# Patient Record
Sex: MALE | Race: WHITE | Employment: PART TIME | URBAN - METROPOLITAN AREA
[De-identification: names, ages, dates, MRNs, and addresses within clinical notes are randomized per-mention and may not be internally consistent; named-entity substitution may affect disease eponyms.]

---

## 2019-12-17 ENCOUNTER — APPOINTMENT (OUTPATIENT)
Dept: GENERAL RADIOLOGY | Age: 24
End: 2019-12-17
Attending: EMERGENCY MEDICINE
Payer: OTHER MISCELLANEOUS

## 2019-12-17 ENCOUNTER — HOSPITAL ENCOUNTER (EMERGENCY)
Age: 24
Discharge: HOME | End: 2019-12-17
Attending: EMERGENCY MEDICINE
Payer: OTHER MISCELLANEOUS

## 2019-12-17 VITALS
WEIGHT: 145 LBS | RESPIRATION RATE: 18 BRPM | OXYGEN SATURATION: 100 % | SYSTOLIC BLOOD PRESSURE: 126 MMHG | TEMPERATURE: 98.8 F | DIASTOLIC BLOOD PRESSURE: 73 MMHG | HEART RATE: 74 BPM

## 2019-12-17 DIAGNOSIS — Z23 NEED FOR TDAP VACCINATION: ICD-10-CM

## 2019-12-17 DIAGNOSIS — S61.217A LACERATION OF LEFT LITTLE FINGER WITHOUT FOREIGN BODY WITHOUT DAMAGE TO NAIL, INITIAL ENCOUNTER: Primary | ICD-10-CM

## 2019-12-17 DIAGNOSIS — Z02.6 ENCOUNTER RELATED TO WORKER'S COMPENSATION CLAIM: ICD-10-CM

## 2019-12-17 PROCEDURE — 99283 EMERGENCY DEPT VISIT LOW MDM: CPT

## 2019-12-17 PROCEDURE — 77030002916 HC SUT ETHLN J&J -A

## 2019-12-17 PROCEDURE — 75810000293 HC SIMP/SUPERF WND  RPR

## 2019-12-17 PROCEDURE — 74011250636 HC RX REV CODE- 250/636: Performed by: PHYSICIAN ASSISTANT

## 2019-12-17 PROCEDURE — 90471 IMMUNIZATION ADMIN: CPT

## 2019-12-17 PROCEDURE — 90715 TDAP VACCINE 7 YRS/> IM: CPT | Performed by: PHYSICIAN ASSISTANT

## 2019-12-17 PROCEDURE — 73140 X-RAY EXAM OF FINGER(S): CPT

## 2019-12-17 PROCEDURE — 77030008304 HC SPLNT FNGR ALUM DERY -A

## 2019-12-17 RX ORDER — BACITRACIN 500 [USP'U]/G
OINTMENT TOPICAL
Status: DISCONTINUED | OUTPATIENT
Start: 2019-12-17 | End: 2019-12-17 | Stop reason: HOSPADM

## 2019-12-17 RX ADMIN — TETANUS TOXOID, REDUCED DIPHTHERIA TOXOID AND ACELLULAR PERTUSSIS VACCINE, ADSORBED 0.5 ML: 5; 2.5; 8; 8; 2.5 SUSPENSION INTRAMUSCULAR at 17:56

## 2019-12-17 NOTE — ED TRIAGE NOTES
Patient arrived to ed with a laceration to left 5th finger.  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

## 2019-12-17 NOTE — ED NOTES
Bacitracin and non adhesive dressing and frog splint applied to LEFT pinky digit    Discharge instructions reviewed with the patient with opportunity for questions given. The patient verbalized understanding. Patient armband removed and shredded. Patient in stable condition at time of discharge.

## 2019-12-17 NOTE — ED PROVIDER NOTES
EMERGENCY DEPARTMENT HISTORY AND PHYSICAL EXAM    Date: 12/17/2019  Patient Name: Swathi Thornton    History of Presenting Illness     Chief Complaint   Patient presents with    Laceration         History Provided By: Patient    Chief Complaint: laceration    HPI(Context):   5:28 PM  Swathi Thornton is a 27 y.o. male who presents to the emergency department C/O laceration to left 5th finger. Associated sxs include numbness to left fifth digit. Pt works at [employer] and cut finger with knife while at work. Bleeding controlled with pressure. Pt reports numbness to tip of left 5th digit. Pt unsure of last tetanus. Pt denies reduced ROM, blood thinner use, and any other sxs or complaints. Pt states this is worker's comp claim. PCP: None        Past History     Past Medical History:  History reviewed. No pertinent past medical history. Past Surgical History:  History reviewed. No pertinent surgical history. Family History:  History reviewed. No pertinent family history. Social History:  Social History     Tobacco Use    Smoking status: Never Smoker    Smokeless tobacco: Never Used   Substance Use Topics    Alcohol use: Not on file    Drug use: Not on file       Allergies:  No Known Allergies      Review of Systems   Review of Systems   Musculoskeletal: Positive for arthralgias. Skin: Positive for wound. Neurological: Positive for numbness. Negative for light-headedness. Hematological: Does not bruise/bleed easily. All other systems reviewed and are negative. Physical Exam     Vitals:    12/17/19 1719   BP: 126/73   Pulse: 74   Resp: 18   Temp: 98.8 °F (37.1 °C)   SpO2: 100%   Weight: 65.8 kg (145 lb)     Physical Exam  Vitals signs and nursing note reviewed. Constitutional:       General: He is not in acute distress. Comments:  male in NAD. Alert. Appears comfortable. HENT:      Head: Normocephalic and atraumatic.       Right Ear: External ear normal.      Left Ear: External ear normal.      Nose: Nose normal.   Neck:      Musculoskeletal: Normal range of motion. Cardiovascular:      Rate and Rhythm: Normal rate and regular rhythm. Pulses: Normal pulses. Pulmonary:      Effort: Pulmonary effort is normal.   Musculoskeletal: Normal range of motion. Left hand: He exhibits tenderness and laceration. He exhibits normal range of motion, normal capillary refill and no swelling. Normal sensation noted. Normal strength noted. Hands:    Neurological:      Mental Status: He is alert and oriented to person, place, and time. Psychiatric:         Mood and Affect: Mood normal.             Diagnostic Study Results     Labs -   No results found for this or any previous visit (from the past 12 hour(s)). XR 5TH FINGER LT MIN 2 V   Final Result   Impression:   ============      Soft tissue injury. No acute osseous abnormality. No radiopaque foreign body. CT Results  (Last 48 hours)    None        CXR Results  (Last 48 hours)    None          Medications given in the ED-  Medications   diph,Pertuss(AC),Tet Vac-PF (BOOSTRIX) suspension 0.5 mL (0.5 mL IntraMUSCular Given 12/17/19 6073)         Medical Decision Making   I am the first provider for this patient. I reviewed the vital signs, available nursing notes, past medical history, past surgical history, family history and social history. Vital Signs-Reviewed the patient's vital signs. Pulse Oximetry Analysis - 100% on RA     Records Reviewed: Nursing Notes    Provider Notes (Medical Decision Making): lac to left fifth digit. FROM. Reduced sensation to distal aspect but <2 sec cap refill.  Will perform suture repair and update tetanus    Procedures:  Wound Repair  Date/Time: 12/17/2019 6:35 PM  Performed by: [id number] provider: Dr. Anne-Marie Vasquez  Preparation: sterile field established and skin prepped with ChloraPrep  Location details: left small finger  Wound length:2.5 cm or less  Anesthesia: local infiltration    Anesthesia:  Local Anesthetic: lidocaine 1% without epinephrine  Anesthetic total: 1 mL  Foreign bodies: no foreign bodies  Irrigation solution: saline  Irrigation method: syringe  Skin closure: 5-0 nylon  Number of sutures: 3  Technique: simple  Approximation: close  Dressing: antibiotic ointment, splint and 4x4  Patient tolerance: Patient tolerated the procedure well with no immediate complications  My total time at bedside, performing this procedure was 1-15 minutes. ED Course:   5:28 PM Initial assessment performed. The patients presenting problems have been discussed, and they are in agreement with the care plan formulated and outlined with them. I have encouraged them to ask questions as they arise throughout their visit. Diagnosis and Disposition       Successful repair. Splinted. Tetanus updated. Suture removal in 7 days. Worker's comp paperwork completed. Reasons to RTED discussed with pt. All questions answered. Pt feels comfortable going home at this time. Pt expressed understanding and he agrees with plan. 1. Laceration of left little finger without foreign body without damage to nail, initial encounter    2. Need for Tdap vaccination    3. Encounter related to worker's compensation claim        PLAN:  1. D/C Home  2. There are no discharge medications for this patient. 3.   Follow-up Information     Follow up With Specialties Details Why [facility]    THE [facility] EMERGENCY DEPT Emergency Medicine  suture removal in 7 days [address] [place] [address]  [phone]    [address] [place] [address] N Occupational Medicine  Follow up with occupational health [address] [place] [address] #A  Kaushik Flores [address] [place] [address] Drive    Tati Anderson MD Plastic Surgery  may follow up with hand surgeon as needed [address] [place] [address] Crossing [phone]          _______________________________    Attestations:   This note is prepared by Bhumika Harry Isabel Hernandez PA-C.  _______________________________          Please note that this dictation was completed with [employer], the computer voice recognition software. Quite often unanticipated grammatical, syntax, homophones, and other interpretive errors are inadvertently transcribed by the computer software. Please disregard these errors. Please excuse any errors that have escaped final proofreading.

## 2019-12-17 NOTE — LETTER
[facility] [place] 
THE [facility] EMERGENCY DEPT 
[address] [address] [phone] Candie Salvador Date: 12/17/2019 Sex: male Ailyn Kirk [facility] [address] YOB: 1995 Age: 27 y.o. Occupational Medicine Return to Work Form          Date of Treatment:  12/17/2019 Diagnosis: ICD-10-CM ICD-9-CM 1. Laceration of left little finger without foreign body without damage to nail, initial encounter S61.217A 883.0 2. Need for Tdap vaccination Z23 V06.1 3. Encounter related to worker's compensation claim Z02.6 V70.3 Instructions:  Employee must return copy of this report to Personnel and/or Supervisor. Patient Identification - Company Protocol:   
 [x]  Checked & Followed []  Not Available PART I:  Check Treatment [x]  X-ray   []  Lab  [x]  Discharge Instructions Given  []  Rx Given 
 [x]  Non-Prescription Meds  [x]  Sutures     []  EKG   
 []  Other (Comment):   
 
Part II:  Disposition 
 []  May Return to Regular Work Without Restrictions [x]  May Return to Restricted Duty as Below May return to full duty on 12/25/2019 when sutures are removed Explanation of RESTRICTIONS (Comment):   
 
 []  May NOT Return to Work until cleared by Referral Specialist or Occupational Medicine MD 
 
Part III:  Follow-Up Follow-up Information Follow up With Specialties Details Why Contact Info [facility] EMERGENCY DEPT Emergency Medicine  suture removal in 7 days [address] [place] [address] 
[phone] [facility] OCCUPATIONAL MED Occupational Medicine  Follow up with occupational health [id number] [place] [address] #A [place] [address] 
[phone] Emy Washington MD Plastic Surgery  may follow up with hand surgeon as needed [facility] [address] [place] [address] 
[phone] Part IV: Was Workers Comp Supervisor Notified  []   Yes  []   No 
 
 Dorie Huerta was seen in the Emergency Department on 12/17/2019 by the following provider(s): 
Attending Provider: Dena Arthur MD 
Physician Assistant: Michelle Jaimes; ED Nurse: PLEASE CALL CASE FACILITATOR, OCCUPATIONAL MEDICINE  
[phone] TO SCHEDULE AN APPOINTMENT Referral Physicians: Julita Gallo MD 
[address]. [address] [facility]. [address], [address]   [address], [address] Phone:  [phone]; Fax:  [phone] ORTHOPEDICS [address] [facility] [facility] [facility]. [address]    [address], [address] 
[phone] [facility] [address] [facility], [address], [address] 
[phone]

## 2019-12-25 ENCOUNTER — HOSPITAL ENCOUNTER (EMERGENCY)
Age: 24
Discharge: HOME | End: 2019-12-25
Attending: EMERGENCY MEDICINE
Payer: MEDICAID

## 2019-12-25 VITALS
WEIGHT: 145 LBS | OXYGEN SATURATION: 100 % | SYSTOLIC BLOOD PRESSURE: 120 MMHG | HEART RATE: 66 BPM | HEIGHT: 66 IN | TEMPERATURE: 98.3 F | DIASTOLIC BLOOD PRESSURE: 72 MMHG | BODY MASS INDEX: 23.3 KG/M2 | RESPIRATION RATE: 14 BRPM

## 2019-12-25 DIAGNOSIS — Z51.89 VISIT FOR WOUND CHECK: Primary | ICD-10-CM

## 2019-12-25 PROCEDURE — 75810000275 HC EMERGENCY DEPT VISIT NO LEVEL OF CARE

## 2019-12-25 NOTE — ED PROVIDER NOTES
EMERGENCY DEPARTMENT HISTORY AND PHYSICAL EXAM    Date: 12/25/2019  Patient Name: Amina Hdz    History of Presenting Illness     Chief Complaint   Patient presents with    Suture Removal         History Provided By: Patient    Amina Hdz is a 27 y.o. male who presents to the emergency department C/O wound check. Patient states he was seen in this facility 1 week ago for laceration to his left fifth finger. Patient states he had filed Workmen's Comp. and has been out of work since. Patient works at [employer]. He states he comes in today asking for suture removal told him to come in 1 week for this. Pt denies new injury, bleeding, drainage from wound and any other sxs or complaints. PCP: None        Past History     Past Medical History:  History reviewed. No pertinent past medical history. Past Surgical History:  History reviewed. No pertinent surgical history. Family History:  History reviewed. No pertinent family history. Social History:  Social History     Tobacco Use    Smoking status: Never Smoker    Smokeless tobacco: Never Used   Substance Use Topics    Alcohol use: Not on file    Drug use: Not on file       Allergies:  No Known Allergies      Review of Systems   Review of Systems   Constitutional: Negative for fever. Musculoskeletal: Negative for arthralgias. Skin: Positive for wound. All other systems reviewed and are negative. Physical Exam     Vitals:    12/25/19 1645   BP: 120/72   Pulse: 66   Resp: 14   Temp: 98.3 °F (36.8 °C)   SpO2: 100%   Weight: 65.8 kg (145 lb)   Height: 5' 6\" (1.676 m)     Physical Exam  Vitals signs and nursing note reviewed. Constitutional:       Appearance: Normal appearance. HENT:      Head: Normocephalic and atraumatic. Musculoskeletal: Normal range of motion. Comments: Left fifth finger with 1/2 cm laceration and volar aspect 3 stitches in place.   No signs of infection, full range of motion, neurovascular intact. Wound appears moist still not completely healed not appropriate to remove stitches at this time   Skin:     General: Skin is warm and dry. Neurological:      General: No focal deficit present. Mental Status: He is alert and oriented to person, place, and time. Psychiatric:         Mood and Affect: Mood normal.         Behavior: Behavior normal.           Diagnostic Study Results     Labs -   No results found for this or any previous visit (from the past 12 hour(s)). Radiologic Studies -   No orders to display     CT Results  (Last 48 hours)    None        CXR Results  (Last 48 hours)    None          Medications given in the ED-  Medications - No data to display      Medical Decision Making   I am the first provider for this patient. I reviewed the vital signs, available nursing notes, past medical history, past surgical history, family history and social history. Vital Signs-Reviewed the patient's vital signs. Records Reviewed: Nursing Notes    Procedures:  Procedures    ED Course:   5:02 PM   Initial assessment performed. The patients presenting problems have been discussed, and they are in agreement with the care plan formulated and outlined with them. I have encouraged them to ask questions as they arise throughout their visit. Discussion: 27 y.o. male with sutures placed at this facility 1 week ago Workmen's Comp. at [employer] returns today for suture removal.  Wound is clean without signs of infection however still moist and not completely healed not appropriate to remove stitches just yet. Patient advised to keep wound dry allow it some air so it can continue to heal.  She can follow-up here in an a few more days for repeat wound check or with occupational health as outlined by his Workmen's Comp. Return precautions discussed. Diagnosis and Disposition       DISCHARGE NOTE:  Valentin Hollingsworth's  results have been reviewed with him.   He has been counseled regarding his diagnosis, treatment, and plan. He verbally conveys understanding and agreement of the signs, symptoms, diagnosis, treatment and prognosis and additionally agrees to follow up as discussed. He also agrees with the care-plan and conveys that all of his questions have been answered. I have also provided discharge instructions for him that include: educational information regarding their diagnosis and treatment, and list of reasons why they would want to return to the ED prior to their follow-up appointment, should his condition change. He has been provided with education for proper emergency department utilization. CLINICAL IMPRESSION:    1. Visit for wound check        PLAN:  1. D/C Home  2. There are no discharge medications for this patient. 3.   Follow-up Information     Follow up With Specialties Details Why Contact Info    your Occupational Healrth Dept as outlined by your Bebe Lady  Schedule an appointment as soon as possible for a visit      THE [facility] EMERGENCY DEPT Emergency Medicine  As needed, If symptoms worsen [address] [address]  [phone]                  Please note that this dictation was completed with [employer], the computer voice recognition software. Quite often unanticipated grammatical, syntax, homophones, and other interpretive errors are inadvertently transcribed by the computer software. Please disregard these errors. Please excuse any errors that have escaped final proofreading.

## 2019-12-25 NOTE — LETTER
[facility] [place] 
[facility] 
[address] [address] [phone] Work/School Note Date: 12/25/2019 To Whom It May concern: 
 
Nuno Busby was seen and treated today in the emergency room by the following provider(s): 
Attending Provider: Jean Dee MD 
Physician Assistant: TEOFILO Casanova. Nuno Busby may return to work on after cleared by [employer]. Patient has sutures on his finger that are still in place and not ready to be removed. 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 This may impede wound healing.  
 
Sincerely, 
 
 
 
 
Treasure Duane, PA